# Patient Record
Sex: FEMALE | Race: WHITE | Employment: FULL TIME | URBAN - METROPOLITAN AREA
[De-identification: names, ages, dates, MRNs, and addresses within clinical notes are randomized per-mention and may not be internally consistent; named-entity substitution may affect disease eponyms.]

---

## 2019-09-11 ENCOUNTER — HOSPITAL ENCOUNTER (OUTPATIENT)
Dept: CT IMAGING | Age: 34
Discharge: HOME | End: 2019-09-11
Payer: COMMERCIAL

## 2019-09-11 DIAGNOSIS — R10.32 ABDOMINAL PAIN, LEFT LOWER QUADRANT: ICD-10-CM

## 2019-09-11 PROCEDURE — 74177 CT ABD & PELVIS W/CONTRAST: CPT

## 2019-09-11 RX ORDER — SODIUM CHLORIDE 0.9 % (FLUSH) 0.9 %
10 SYRINGE (ML) INJECTION
Status: COMPLETED | OUTPATIENT
Start: 2019-09-11 | End: 2019-09-11

## 2019-09-11 RX ADMIN — Medication 10 ML: at 17:00

## 2019-11-14 ENCOUNTER — OFFICE VISIT (OUTPATIENT)
Dept: RHEUMATOLOGY | Age: 34
End: 2019-11-14

## 2019-11-14 VITALS
WEIGHT: 134 LBS | RESPIRATION RATE: 18 BRPM | TEMPERATURE: 97.7 F | BODY MASS INDEX: 21.03 KG/M2 | HEIGHT: 67 IN | SYSTOLIC BLOOD PRESSURE: 128 MMHG | DIASTOLIC BLOOD PRESSURE: 71 MMHG | HEART RATE: 85 BPM | OXYGEN SATURATION: 99 %

## 2019-11-14 DIAGNOSIS — A04.72 C. DIFFICILE DIARRHEA: ICD-10-CM

## 2019-11-14 DIAGNOSIS — R10.2 PELVIC PAIN: Primary | ICD-10-CM

## 2019-11-14 DIAGNOSIS — M53.3 SACROILIAC JOINT PAIN: ICD-10-CM

## 2019-11-14 RX ORDER — OXCARBAZEPINE 150 MG/1
TABLET, FILM COATED ORAL
Refills: 0 | COMMUNITY
Start: 2019-10-29 | End: 2021-11-08

## 2019-11-14 RX ORDER — ACETAMINOPHEN AND CODEINE PHOSPHATE 300; 30 MG/1; MG/1
TABLET ORAL
COMMUNITY
Start: 2019-11-13 | End: 2021-11-08

## 2019-11-14 RX ORDER — IBUPROFEN 800 MG/1
TABLET ORAL
COMMUNITY
End: 2021-11-08

## 2019-11-14 RX ORDER — DICLOFENAC SODIUM 75 MG/1
TABLET, DELAYED RELEASE ORAL
COMMUNITY
Start: 2019-11-12 | End: 2021-11-08

## 2019-11-14 RX ORDER — DEXTROAMPHETAMINE SACCHARATE, AMPHETAMINE ASPARTATE, DEXTROAMPHETAMINE SULFATE AND AMPHETAMINE SULFATE 3.75; 3.75; 3.75; 3.75 MG/1; MG/1; MG/1; MG/1
15 TABLET ORAL
COMMUNITY

## 2019-11-14 RX ORDER — MESALAMINE 800 MG/1
TABLET, DELAYED RELEASE ORAL
Refills: 1 | COMMUNITY
Start: 2019-10-31 | End: 2021-11-08

## 2019-11-14 RX ORDER — BISMUTH SUBSALICYLATE 262 MG
1 TABLET,CHEWABLE ORAL DAILY
COMMUNITY
End: 2021-11-08

## 2019-11-14 NOTE — PATIENT INSTRUCTIONS
Please fill out your [address] you will receive after your visit in the mail or via [address]!

## 2019-11-14 NOTE — PROGRESS NOTES
REASON FOR VISIT    This is the initial evaluation for Ms. William a 37 y.o.  female for question of pelvic pain. The patient is referred to the [facility] at the request of Genevia Patient. HISTORY OF PRESENT ILLNESS     Previous medical records reviewed and summarized: yes    MHAQ: 0.75  Pain scale: 0/10    This is a 37 y.o. female. The patient notes that in 8/2019, she was very active and on 8/25, she had eaten a steak from a beach trip and she got fever, chills and left lateral hip pain along with diarrhea. She went to see her gyn because she thought it was ovarian cyst.  3 weeks later she was diagnosed with C. Diff and the flu. By day 5 of diarrhea, she had pain in lower mid back which was shooting down and now she has pain in entire pelvis on the left side. She was treated for c.diff with 10 days of vancomycin. Diarrhea has stopped now. She is now on azacol. She had worse pain when she weaned off of azacol and now she is back on it. She has pain in lower mid back, wrapping around the left side and into the groin. She has no pain when she is sleeping and it does not wake her up. When she is lying in bed, she feels it. When she gets up and walks and it is slightly better. Shower helps but by the time she gets ready to go to work, it is very severe. She has been working through all of this. She feels pain is slightly better with walking. Sitting makes it worse. Bending does not effect it. NO other pain. She has numbness in left foot (precedes all this). She has tried tylenol 3 and that helps. Prednisone was helpful. She was given 40 mg and she tapered it over 5 days. By day 3, the pain wasn't constant or creeping and was more manageable. The pain returned within 5-7 days of stopping the prednisone. NO other joint pain or swelling. NO eye issues or rashes.    + dry mouth right now.    She has had trans vaginal ultrasounds which showed right ovarian cyst. She had IUD removed. She had CT Abdomen which showed density on left side and and MRI pelvis and abdomen didn't show anything. She has had 3 x-rays of pelvic area with orthovirginia and it reportedly showed SI joint inflammation. REVIEW OF SYSTEMS    A 15 point review of systems was performed and summarized below. The questionnaire was reviewed with the patient and scanned into the patient's medical record.     General: denies recent weight gain, recent weight loss, fatigue, weakness, fever, drenching night sweats  Musculoskeletal: endorses  joint pain,  muscle paindenies joint swelling, morning stiffness   Ears: denies ringing in ears, hearing loss, deafness  Eyes: denies pain, light sensitive, redness, blindness, double vision, blurred vision, excess tearing, dryness, foreign body sensation  Mouth: denies sore tongue, oral ulcers, loss of taste, dryness, increased dental caries  Nose: denies nosebleeds, nasal ulcers  Throat: denies food stuck when swallowing, difficulty with swallowing, hoarseness, pain in jaw while chewing  Neck: denies swollen glands, tender glands  Cardiopulmonary: denies pain in chest with deep breaths, pain in chest when lying down, murmurs, sudden changes in heart beat, wheezing, dry cough, productive cough, shortness of breath at rest, shortness of breath on exertion, coughing of blood  Gastrointestinal: denies nausea, heartburn, stomach pain relieved by food, chronic constipation, chronic diarrhea, blood in stools, black stools  Genitourinary: denies vaginal dryness, pain or burning on urination, blood in urine, cloudy urine, vaginal ulcers, penile ulcers  Hematologic: denies anemia, bleeding tendency, blood clots, bleeding gums  Skin: denies easy bruising, hair loss, rash, rash worsened after sun exposure, hives/urticaria, skin thickening, skin tightness, nodules/bumps, color changes of hands or feet in the cold (Raynaud's)  Neurologic: endorses numbness or tingling in feet, muscle weakness  denies numbness or tingling in hands,   Psychiatric: denies depression, excessive worries, PTSD, Bipolar  Sleep: endorses poor sleep (8 hours),denies denies snoring, apnea, daytime somnolence, difficulty falling asleep, difficulty staying asleep     PAST MEDICAL HISTORY    History reviewed. No pertinent past medical history. Past Surgical History:   Procedure Laterality Date    HX COLONOSCOPY         FAMILY HISTORY    Family History   Problem Relation Age of Onset    Thyroid Disease Mother     Psoriasis Mother        SOCIAL HISTORY    Social History     Tobacco Use    Smoking status: Never Smoker    Smokeless tobacco: Never Used   Substance Use Topics    Alcohol use: Yes    Drug use: Never       MEDICATIONS      Current Outpatient Medications:     acetaminophen-codeine (TYLENOL #3) 300-30 mg per tablet, , Disp: , Rfl:     diclofenac EC (VOLTAREN) 75 mg EC tablet, , Disp: , Rfl:     OXcarbazepine (TRILEPTAL) 150 mg tablet, TK 1 T PO FOR 4 DAYS THEN INCREASE TO 2 TS PO BID PER SCHEDULE GIVEN, Disp: , Rfl: 0    mesalamine DR (ASACOL HD) 800 mg DR tablet, TK 1 T PO BID, Disp: , Rfl: 1    B.infantis-B.ani-B.long-B.bifi (PROBIOTIC 4X) 10-15 mg TbEC, Take  by mouth., Disp: , Rfl:     multivitamin (ONE A DAY) tablet, Take 1 Tab by mouth daily. , Disp: , Rfl:     ibuprofen (MOTRIN) 800 mg tablet, Take  by mouth., Disp: , Rfl:     dextroamphetamine-amphetamine (ADDERALL) 15 mg tablet, Take 15 mg by mouth., Disp: , Rfl:       ALLERGIES    Allergies   Allergen Reactions    Tramadol Other (comments)     confusion       PHYSICAL EXAMINATION    Visit Vitals  /71 (BP 1 Location: Right arm, BP Patient Position: Sitting)   Pulse 85   Temp 97.7 °F (36.5 °C) (Oral)   Resp 18   Ht 5' 7\" (1.702 m)   Wt 134 lb (60.8 kg)   SpO2 99%   BMI 20.99 kg/m²     Body mass index is 20.99 kg/m². General: NAD  HEENT: PERRL, anicteric, non-injected sclerae; oropharynx without ulcers, erythema, or exudate.   Moist mucous membranes. Lymphatic: No cervical or axillary lymphadenopathy. Cardiovascular: S1, S2,no R/M/G  Pulmonary: CTA b/l. No wheezes/rales/rhonchi. Abdominal: Soft,NTND, + BS. Skin: No rash, nodules, or periungual changes. Neuro: Alert; able to carry normal conversation    Musculoskeletal:   No swollen joints  KEIKO ngative b/l  No TTP of trochanteric bursa or pain with ROM movements of the hip joints b/l    DATA REVIEW    Prior medical records were reviewed and if applicable are summarized as below:    Labs:   N/A    Imaging:   CT abdomen: small b/l pleural effusions; small area of density in the left uterus. MRI abdomen/pelvis (9/2019): physiologic cyst in left ovary. Large right ovarian cyst.      ASSESSMENT AND PLAN    A 37 y.o. female presents for evaluation of lower back pain. The patient's symptoms are likely mechanical in nature and could be due to gluteus medius tendonopathy, vs other muscle strain. # Lower back pain: likely mechanical  - will obtain records and then evaluate the patient at next visit  - MRI did not reveal sacroilitis  - prednisone can often cause response even in mechanical issues and therefore cannot rely on this to make a diagnosis of inflammatory arthritis  - will obtain records    # HCM:  - flu vaccine 10/2019    RTC in 3 weeks    The patient voiced understanding of the aforementioned assessment and plan. Summary of plan was provided in the After Visit Summary patient instructions. I also provided education about [facility] and utility. Ms. Saadia Rollins has a reminder for a \"due or due soon\" health maintenance. I have asked that she contact her primary care provider for follow-up on this health maintenance.     TODAY'S ORDERS    Orders Placed This Encounter    HLA-B27    CBC WITH AUTOMATED DIFF    METABOLIC PANEL, COMPREHENSIVE    C REACTIVE PROTEIN, QT    SED RATE (ESR)    acetaminophen-codeine (TYLENOL #3) 300-30 mg per tablet    diclofenac EC (VOLTAREN) 75 mg EC tablet    OXcarbazepine (TRILEPTAL) 150 mg tablet    mesalamine DR (ASACOL HD) 800 mg DR tablet    B.infantis-B.ani-B.long-B.bifi (PROBIOTIC 4X) 10-15 mg TbEC    multivitamin (ONE A DAY) tablet    ibuprofen (MOTRIN) 800 mg tablet    dextroamphetamine-amphetamine (ADDERALL) 15 mg tablet       Future Appointments   Date Time Provider Bladimir Singleton   11/27/2019  2:00 PM Shon Pappas MD [address] [place], [place] [address]on [address], MD    Adult Rheumatology   [facility]  A Part of [facility], [address] [facility], [address]   Phone [phone]  Fax [phone]

## 2019-11-19 LAB
ALBUMIN SERPL-MCNC: 5 G/DL (ref 3.5–5.5)
ALBUMIN/GLOB SERPL: 2.5 {RATIO} (ref 1.2–2.2)
ALP SERPL-CCNC: 51 IU/L (ref 39–117)
ALT SERPL-CCNC: 17 IU/L (ref 0–32)
AST SERPL-CCNC: 13 IU/L (ref 0–40)
BASOPHILS # BLD AUTO: 0.1 X10E3/UL (ref 0–0.2)
BASOPHILS NFR BLD AUTO: 1 %
BILIRUB SERPL-MCNC: 0.4 MG/DL (ref 0–1.2)
BUN SERPL-MCNC: 13 MG/DL (ref 6–20)
BUN/CREAT SERPL: 18 (ref 9–23)
CALCIUM SERPL-MCNC: 9.7 MG/DL (ref 8.7–10.2)
CHLORIDE SERPL-SCNC: 101 MMOL/L (ref 96–106)
CO2 SERPL-SCNC: 20 MMOL/L (ref 20–29)
CREAT SERPL-MCNC: 0.73 MG/DL (ref 0.57–1)
CRP SERPL-MCNC: <1 MG/L (ref 0–10)
EOSINOPHIL # BLD AUTO: 0.1 X10E3/UL (ref 0–0.4)
EOSINOPHIL NFR BLD AUTO: 1 %
ERYTHROCYTE [DISTWIDTH] IN BLOOD BY AUTOMATED COUNT: 12.2 % (ref 12.3–15.4)
ERYTHROCYTE [SEDIMENTATION RATE] IN BLOOD BY WESTERGREN METHOD: 2 MM/HR (ref 0–32)
GLOBULIN SER CALC-MCNC: 2 G/DL (ref 1.5–4.5)
GLUCOSE SERPL-MCNC: 86 MG/DL (ref 65–99)
HCT VFR BLD AUTO: 40.3 % (ref 34–46.6)
HGB BLD-MCNC: 13.4 G/DL (ref 11.1–15.9)
HLA-B27 QL NAA+PROBE: NEGATIVE
IMM GRANULOCYTES # BLD AUTO: 0 X10E3/UL (ref 0–0.1)
IMM GRANULOCYTES NFR BLD AUTO: 0 %
LYMPHOCYTES # BLD AUTO: 2.7 X10E3/UL (ref 0.7–3.1)
LYMPHOCYTES NFR BLD AUTO: 37 %
MCH RBC QN AUTO: 29.5 PG (ref 26.6–33)
MCHC RBC AUTO-ENTMCNC: 33.3 G/DL (ref 31.5–35.7)
MCV RBC AUTO: 89 FL (ref 79–97)
MONOCYTES # BLD AUTO: 0.4 X10E3/UL (ref 0.1–0.9)
MONOCYTES NFR BLD AUTO: 5 %
NEUTROPHILS # BLD AUTO: 4.1 X10E3/UL (ref 1.4–7)
NEUTROPHILS NFR BLD AUTO: 56 %
PLATELET # BLD AUTO: 297 X10E3/UL (ref 150–450)
POTASSIUM SERPL-SCNC: 3.8 MMOL/L (ref 3.5–5.2)
PROT SERPL-MCNC: 7 G/DL (ref 6–8.5)
RBC # BLD AUTO: 4.54 X10E6/UL (ref 3.77–5.28)
SODIUM SERPL-SCNC: 139 MMOL/L (ref 134–144)
WBC # BLD AUTO: 7.4 X10E3/UL (ref 3.4–10.8)

## 2019-11-27 ENCOUNTER — OFFICE VISIT (OUTPATIENT)
Dept: RHEUMATOLOGY | Age: 34
End: 2019-11-27

## 2019-11-27 VITALS
RESPIRATION RATE: 16 BRPM | HEIGHT: 67 IN | SYSTOLIC BLOOD PRESSURE: 122 MMHG | HEART RATE: 99 BPM | TEMPERATURE: 97.8 F | DIASTOLIC BLOOD PRESSURE: 73 MMHG | OXYGEN SATURATION: 99 % | BODY MASS INDEX: 20.5 KG/M2 | WEIGHT: 130.6 LBS

## 2019-11-27 DIAGNOSIS — S76.012A SPRAIN, GLUTEUS MEDIUS, LEFT, INITIAL ENCOUNTER: ICD-10-CM

## 2019-11-27 DIAGNOSIS — M70.72 ISCHIAL BURSITIS OF LEFT SIDE: Primary | ICD-10-CM

## 2019-11-27 DIAGNOSIS — G57.02 PIRIFORMIS SYNDROME OF LEFT SIDE: ICD-10-CM

## 2019-11-27 DIAGNOSIS — R10.2 PELVIC PAIN: ICD-10-CM

## 2019-11-27 RX ORDER — MELOXICAM 15 MG/1
15 TABLET ORAL DAILY
COMMUNITY
End: 2021-11-08

## 2019-11-27 RX ORDER — DICLOFENAC SODIUM 10 MG/G
2 GEL TOPICAL 4 TIMES DAILY
Qty: 1 EACH | Refills: 5 | Status: SHIPPED | OUTPATIENT
Start: 2019-11-27 | End: 2021-11-08

## 2019-11-27 NOTE — PROGRESS NOTES
Chief Complaint   Patient presents with    Joint Pain     lower left sacrum area     1. Have you been to the ER, urgent care clinic since your last visit? Hospitalized since your last visit? No    2. Have you seen or consulted any other health care providers outside of the [address] [place] [address] since your last visit? Include any pap smears or colon screening.  Yes Where: or[facility]opedic

## 2019-11-27 NOTE — PROGRESS NOTES
REASON FOR VISIT    This is the initial evaluation for Ms. William a 37 y.o.  female for question of pelvic pain. The patient is referred to the [facility] at the request of Aron Lerma. HISTORY OF PRESENT ILLNESS     Previous medical records reviewed and summarized: yes    MHAQ: 0.625  Pain scale: 0/10  Patient notes she still has the pain but it is slightly improved with PT. She saw orthopedic who also felt this was a mechanical issue. NSAIDs have not helped. REVIEW OF SYSTEMS    A 15 point review of systems was performed and summarized below. The questionnaire was reviewed with the patient and scanned into the patient's medical record.     General: denies recent weight gain, recent weight loss, fatigue, weakness, fever, drenching night sweats  Musculoskeletal: endorses  joint pain,  muscle paindenies joint swelling, morning stiffness   Ears: denies ringing in ears, hearing loss, deafness  Eyes: denies pain, light sensitive, redness, blindness, double vision, blurred vision, excess tearing, dryness, foreign body sensation  Mouth: denies sore tongue, oral ulcers, loss of taste, dryness, increased dental caries  Nose: denies nosebleeds, nasal ulcers  Throat: denies food stuck when swallowing, difficulty with swallowing, hoarseness, pain in jaw while chewing  Neck: denies swollen glands, tender glands  Cardiopulmonary: denies pain in chest with deep breaths, pain in chest when lying down, murmurs, sudden changes in heart beat, wheezing, dry cough, productive cough, shortness of breath at rest, shortness of breath on exertion, coughing of blood  Gastrointestinal: denies nausea, heartburn, stomach pain relieved by food, chronic constipation, chronic diarrhea, blood in stools, black stools  Genitourinary: denies vaginal dryness, pain or burning on urination, blood in urine, cloudy urine, vaginal ulcers, penile ulcers  Hematologic: denies anemia, bleeding tendency, blood clots, bleeding gums  Skin: denies easy bruising, hair loss, rash, rash worsened after sun exposure, hives/urticaria, skin thickening, skin tightness, nodules/bumps, color changes of hands or feet in the cold (Raynaud's)  Neurologic: endorses numbness or tingling in feet, muscle weakness  denies numbness or tingling in hands,   Psychiatric: denies depression, excessive worries, PTSD, Bipolar  Sleep: endorses poor sleep (8 hours),denies denies snoring, apnea, daytime somnolence, difficulty falling asleep, difficulty staying asleep     PAST MEDICAL HISTORY    History reviewed. No pertinent past medical history. Past Surgical History:   Procedure Laterality Date    HX COLONOSCOPY         FAMILY HISTORY    Family History   Problem Relation Age of Onset    Thyroid Disease Mother     Psoriasis Mother        SOCIAL HISTORY    Social History     Tobacco Use    Smoking status: Never Smoker    Smokeless tobacco: Never Used   Substance Use Topics    Alcohol use: Yes    Drug use: Never       MEDICATIONS      Current Outpatient Medications:     meloxicam (MOBIC) 15 mg tablet, Take 15 mg by mouth daily. , Disp: , Rfl:     diclofenac (VOLTAREN) 1 % gel, Apply 2 g to affected area four (4) times daily. , Disp: 1 Each, Rfl: 5    acetaminophen-codeine (TYLENOL #3) 300-30 mg per tablet, , Disp: , Rfl:     mesalamine DR (ASACOL HD) 800 mg DR tablet, TK 1 T PO BID, Disp: , Rfl: 1    B.infantis-B.ani-B.long-B.bifi (PROBIOTIC 4X) 10-15 mg TbEC, Take  by mouth., Disp: , Rfl:     multivitamin (ONE A DAY) tablet, Take 1 Tab by mouth daily. , Disp: , Rfl:     dextroamphetamine-amphetamine (ADDERALL) 15 mg tablet, Take 15 mg by mouth., Disp: , Rfl:     diclofenac EC (VOLTAREN) 75 mg EC tablet, , Disp: , Rfl:     OXcarbazepine (TRILEPTAL) 150 mg tablet, TK 1 T PO FOR 4 DAYS THEN INCREASE TO 2 TS PO BID PER SCHEDULE GIVEN, Disp: , Rfl: 0    ibuprofen (MOTRIN) 800 mg tablet, Take  by mouth., Disp: , Rfl: ALLERGIES    Allergies   Allergen Reactions    Tramadol Other (comments)     confusion       PHYSICAL EXAMINATION    Visit Vitals  /73 (BP 1 Location: Right arm, BP Patient Position: Sitting)   Pulse 99   Temp 97.8 °F (36.6 °C)   Resp 16   Ht 5' 7\" (1.702 m)   Wt 130 lb 9.6 oz (59.2 kg)   SpO2 99%   BMI 20.45 kg/m²     Body mass index is 20.45 kg/m². General: NAD  HEENT: PERRL, anicteric, non-injected sclerae; oropharynx without ulcers, erythema, or exudate. Moist mucous membranes. Lymphatic: No cervical or axillary lymphadenopathy. Cardiovascular: S1, S2,no R/M/G  Pulmonary: CTA b/l. No wheezes/rales/rhonchi. Abdominal: Soft,NTND, + BS. Skin: No rash, nodules, or periungual changes. Neuro: Alert; able to carry normal conversation    Musculoskeletal:   No swollen joints  Gluteus medius tendinopathy maneuver negative  TTP at the left ischial bursa    DATA REVIEW    Prior medical records were reviewed and if applicable are summarized as below:    Labs:   N/A    Imaging:   CT abdomen: small b/l pleural effusions; small area of density in the left uterus. MRI abdomen/pelvis (9/2019): physiologic cyst in left ovary. Large right ovarian cyst.      ASSESSMENT AND PLAN    A 37 y.o. female presents for evaluation of lower back pain. The patient's symptoms are likely mechanical in nature and could be due to gluteus medius tendonopathy, vs piriformis syndrome vs ischial bursitis or combination. # Lower back pain: likely mechanical  - continue PT  - new referral done today  - advised to continue with NSAIDs with food  - voltaren gel prescribed today  - will give her a work letter if she needs to stay out of work longer    # HCM:  - flu vaccine 10/2019    RTC in PRN    The patient voiced understanding of the aforementioned assessment and plan. Summary of plan was provided in the After Visit Summary patient instructions. I also provided education about MyChart setup and utility.     Ms. Leslie Mcneal has a reminder for a \"due or due soon\" health maintenance. I have asked that she contact her primary care provider for follow-up on this health maintenance. TODAY'S ORDERS    Orders Placed This Encounter    REFERRAL TO PHYSICAL THERAPY    meloxicam (MOBIC) 15 mg tablet    diclofenac (VOLTAREN) 1 % gel       No future appointments.     Jeb Fuentes MD    Adult Rheumatology   [facility]  A Part of [facility], [address] [place] [address]   Phone [phone]  Fax [phone]

## 2019-11-27 NOTE — PATIENT INSTRUCTIONS
Please fill out your [address] you will receive after your visit in the mail or via [address]!

## 2021-11-08 ENCOUNTER — OFFICE VISIT (OUTPATIENT)
Dept: NEUROLOGY | Age: 36
End: 2021-11-08
Payer: COMMERCIAL

## 2021-11-08 VITALS
SYSTOLIC BLOOD PRESSURE: 128 MMHG | OXYGEN SATURATION: 98 % | DIASTOLIC BLOOD PRESSURE: 84 MMHG | HEART RATE: 100 BPM | BODY MASS INDEX: 21.03 KG/M2 | HEIGHT: 67 IN | WEIGHT: 134 LBS

## 2021-11-08 DIAGNOSIS — M54.16 CHRONIC LEFT-SIDED LUMBAR RADICULOPATHY: ICD-10-CM

## 2021-11-08 DIAGNOSIS — R29.2 GENERALIZED HYPERREFLEXIA: Primary | ICD-10-CM

## 2021-11-08 DIAGNOSIS — R25.8 CLONUS: ICD-10-CM

## 2021-11-08 PROCEDURE — 99243 OFF/OP CNSLTJ NEW/EST LOW 30: CPT | Performed by: PSYCHIATRY & NEUROLOGY

## 2021-11-08 RX ORDER — ZINC GLUCONATE 10 MG
1 LOZENGE ORAL
COMMUNITY

## 2021-11-08 RX ORDER — DULOXETIN HYDROCHLORIDE 20 MG/1
20 CAPSULE, DELAYED RELEASE ORAL DAILY
COMMUNITY

## 2021-11-08 NOTE — PROGRESS NOTES
Chief Complaint   Patient presents with    New Patient     \" tingling down left leg, and intermittent numbnes in both feet, and a burning sensation in the gluteal area. \"       Referred by: Dr. 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 She has been in physical therapy long-term now with dry needling and trigger point treatments. She was having continued paresthesias more on the left and occasionally the right foot. She saw PMNR at [facility] and had an MRI of the lumbosacral spine done recently showing a small disc protrusion at L4-5. No significant spondylosis or stenosis otherwise. She did see a specialist in pelvic floor management and there is consideration that she is having a lot of muscle spasm of the pelvic floor muscles causing a pseudosciatica down the left leg. She is being considered for Botox injections. She is currently completing a course of Valium which has been very helpful for her symptoms. She is here to discuss the symptoms. Review of Systems   Musculoskeletal: Positive for back pain and myalgias. Neurological: Positive for tingling and sensory change. Negative for weakness. All other systems reviewed and are negative. No past medical history on file. Family History   Problem Relation Age of Onset    Thyroid Disease Mother     Psoriasis Mother      Social History     Socioeconomic History    Marital status: SINGLE     Spouse name: Not on file    Number of children: Not on file    Years of education: Not on file    Highest education level: Not on file   Occupational History    Not on file   Tobacco Use    Smoking status: Never Smoker    Smokeless tobacco: Never Used   Substance and Sexual Activity    Alcohol use:  Yes    Drug use: Never    Sexual activity: Not on file   Other Topics Concern    Not on file   Social History Narrative    Not on file     Social Determinants of Health     Financial Resource Strain:     Difficulty of Paying Living Expenses: Not on file   Food Insecurity:     Worried About Running Out of Food in the Last Year: Not on file    Eligio of Food in the Last Year: Not on file   Transportation Needs:     Lack of Transportation (Medical): Not on file    Lack of Transportation (Non-Medical): Not on file   Physical Activity:     Days of Exercise per Week: Not on file    Minutes of Exercise per Session: Not on file   Stress:     Feeling of Stress : Not on file   Social Connections:     Frequency of Communication with Friends and Family: Not on file    Frequency of Social Gatherings with Friends and Family: Not on file    Attends Yazidism Services: Not on file    Active Member of [employer] or Organizations: Not on file    Attends Club or Organization Meetings: Not on file    Marital Status: Not on file   Intimate Partner Violence:     Fear of Current or Ex-Partner: Not on file    Emotionally Abused: Not on file    Physically Abused: Not on file    Sexually Abused: Not on file   Housing Stability:     Unable to Pay for Housing in the Last Year: Not on file    Number of [place] in the Last Year: Not on file    Unstable Housing in the Last Year: Not on file     Current Outpatient Medications   Medication Sig    DULoxetine (Cymbalta) 20 mg capsule Take 20 mg by mouth daily.  lisdexamfetamine (Vyvanse) 20 mg capsule Take 20 mg by mouth daily.  magnesium 250 mg tab Take 1 Tablet by mouth.  dextroamphetamine-amphetamine (ADDERALL) 15 mg tablet Take 15 mg by mouth. No current facility-administered medications for this visit. Allergies   Allergen Reactions    Tramadol Other (comments)     confusion         Neurologic Exam     Mental Status   Oriented to person, place, and time. Cranial Nerves   Cranial nerves II through XII intact.      Motor Exam   Muscle bulk: normal    Strength Strength 5/5 throughout. Sensory Exam   Light touch normal.     Gait, Coordination, and Reflexes     Gait  Gait: normalBilateral upper extremities 3/4 bilateral patella 3+     Physical Exam  Vitals and nursing note reviewed. Constitutional:       Appearance: Normal appearance. Neurological:      Mental Status: She is alert and oriented to person, place, and time. Gait: Gait is intact. Deep Tendon Reflexes: Strength normal.       Visit Vitals  /84 (BP 1 Location: Right upper arm, BP Patient Position: Sitting)   Pulse 100   Ht 5' 7\" (1.702 m)   Wt 134 lb (60.8 kg)   SpO2 98%   BMI 20.99 kg/m²       Lab Results   Component Value Date/Time    WBC 7.4 11/14/2019 12:04 PM    HGB 13.4 11/14/2019 12:04 PM    HCT 40.3 11/14/2019 12:04 PM    PLATELET 914 38/39/8368 12:04 PM    MCV 89 11/14/2019 12:04 PM     Lab Results   Component Value Date/Time    Glucose 86 11/14/2019 12:04 PM    Creatinine 0.73 11/14/2019 12:04 PM      No results found for: CHOL, CHOLPOCT, HDL, LDL, LDLC, LDLCPOC, LDLCEXT, TRIGL, TGLPOCT, CHHD, CHHDX  Lab Results   Component Value Date/Time    ALT (SGPT) 17 11/14/2019 12:04 PM    Alk. phosphatase 51 11/14/2019 12:04 PM    Bilirubin, total 0.4 11/14/2019 12:04 PM    Albumin 5.0 11/14/2019 12:04 PM    Protein, total 7.0 11/14/2019 12:04 PM    PLATELET 546 81/73/6041 12:04 PM        CT Results (maximum last 3): Results from Hospital Encounter encounter on 09/11/19    CT ABD PELV W CONT    Narrative  EXAM: CT ABD PELV W CONT    INDICATION: Abdominal pain, left lower quadrant left buttock pain extending  toward the left groin    COMPARISON: None    CONTRAST: 100 mL of Isovue-370. TECHNIQUE:  Following the uneventful intravenous administration of contrast, thin axial  images were obtained through the abdomen and pelvis. Coronal and sagittal  reconstructions were generated. Oral contrast was  administered.  CT dose  reduction was achieved through use of a standardized protocol tailored for this  examination and automatic exposure control for dose modulation. FINDINGS:  LUNG BASES: Clear. INCIDENTALLY IMAGED HEART AND MEDIASTINUM: There are very small bilateral  pleural effusions noted. These are quite minimal but they are present. Etiology  is uncertain. Clenton Reas LIVER: No mass or biliary dilatation. GALLBLADDER: Unremarkable. SPLEEN: No mass. PANCREAS: No mass or ductal dilatation. ADRENALS: Unremarkable. KIDNEYS: No mass, calculus, or hydronephrosis. STOMACH: Unremarkable. SMALL BOWEL: No dilatation or wall thickening. COLON: No dilatation or wall thickening. There is no evidence for diverticulitis  or wall thickening. APPENDIX: The appendix is normal.. PERITONEUM: No ascites or pneumoperitoneum. RETROPERITONEUM: No lymphadenopathy or aortic aneurysm. REPRODUCTIVE ORGANS: The uterus is retroverted. Adjacent to the left side of the uterus and possibly arising from the left-sided  uterus is an amorphous area of high density enhancement measuring approximately  4.2 x 3.9 cm. This may represent a subserosal or pedunculated fibroid. Exact  etiology of this is uncertain. Conceivably this could represent vascular  congestion in this area. Further evaluation is suggested. Pelvic MRI or pelvic  ultrasound is suggested. It does appear to be immediately adjacent to a  prominent left internal iliac vein. There is a 3 cm simple cyst in the right ovary. Left ovary images normally. There is a small amount of free fluid that is most likely physiologic. URINARY BLADDER: No mass or calculus. BONES: No destructive bone lesion. ADDITIONAL COMMENTS: N/A    Impression  IMPRESSION:    1. Small bilateral pleural effusions. Etiology is uncertain. Clinical  correlation is suggested.   2. Amorphous area of increased density suggesting enhancement adjacent to and  probably arising from the left side of the uterus this is likely a subserosal or  pedunculated fibroid extending to the left pelvic sidewall. This is not  definitive diagnosis. Pelvic MRI or ultrasound is suggested. 3. 3 cm right ovarian cyst with small amount of free fluid is probably  physiologic. 4. No evidence for appendicitis or diverticulitis. MRI Results (maximum last 3): Results from Abstract encounter on 12/10/19    MRI ABD PELV W WO CONT      PET Results (maximum last 3): No results found for this or any previous visit. Assessment and Plan   Diagnoses and all orders for this visit:    1. Generalized hyperreflexia  -     MRI CERV SPINE WO CONT; Future    2. Clonus  -     MRI CERV SPINE WO CONT; Future    3. Chronic left-sided lumbar radiculopathy      76-year-old woman who has been having some sciatica-like symptoms predominantly down the left leg lesser so on the right foot. It does sound very possible that peripheral nerves from the lumbosacral plexus are being compressed by the pelvic floor muscles. I think she would be a good candidate for consideration of Botox which sounds like is being considered in progress now. On my exam with her today, I did find diffuse hyperreflexia approaching clonus. Because of those findings I am going to complete an MRI of the cervical spine to make sure were not dealing with any type of significant issue like a cord compression or syrinx. I discussed this plan with her and she agrees. Otherwise, continue with the current plans and progress addressing the pelvic floor musculature. 45 minutes of time was spent in total today reviewing the medical record to include the notes from [facility], neuroimaging, face-to-face time with examination, and time completing documentation today. I reviewed and decided to continue the current medications. This clinical note was dictated with an electronic dictation software that can make unintentional errors. If there are any questions, please contact me directly for clarification.       A notice of this visit/encounter being completed has been sent electronically to the patient's PCP and/or referring provider.      [facility] [address]  Diplomate ABPN

## 2021-11-08 NOTE — PROGRESS NOTES
Chief Complaint   Patient presents with    New Patient     \" tingling down left leg, and intermittent numbnes in both feet, and a burning sensation in the gluteal area. \"     Visit Vitals  /84 (BP 1 Location: Right upper arm, BP Patient Position: Sitting)   Pulse 100   Ht 5' 7\" (1.702 m)   Wt 134 lb (60.8 kg)   SpO2 98%   BMI 20.99 kg/m²

## 2021-11-23 ENCOUNTER — HOSPITAL ENCOUNTER (OUTPATIENT)
Dept: MRI IMAGING | Age: 36
Discharge: HOME | End: 2021-11-23
Payer: COMMERCIAL

## 2021-11-23 DIAGNOSIS — R29.2 GENERALIZED HYPERREFLEXIA: ICD-10-CM

## 2021-11-23 DIAGNOSIS — R25.8 CLONUS: ICD-10-CM

## 2021-11-23 PROCEDURE — 72141 MRI NECK SPINE W/O DYE: CPT | Performed by: PSYCHIATRY & NEUROLOGY

## 2021-11-29 NOTE — PROGRESS NOTES
Imaging of the upper spine is very reassuring. There is no evidence of mass or multiple sclerosis which is what I wanted to rule out. Continue with her other plans and progress.

## 2021-12-09 NOTE — PROGRESS NOTES
Called and LVM for the patient with imaging results, requested a call back with any additional questions

## 2023-09-05 ENCOUNTER — TELEPHONE (OUTPATIENT)
Age: 38
End: 2023-09-05

## 2023-09-05 NOTE — TELEPHONE ENCOUNTER
OK to establish? Reason for Call: New Patient/New to Provider Appointment needed: New   Patient Request Appointment     QUESTIONS     Reason for appointment request? Requested Provider unavailable - Norma Gale       Additional Information for Provider?  Patient states she spoke with desired   provider above who informed her to contact his nurse/office and he would   take her on to establish care with.   ---------------------------------------------------------------------------

## 2023-12-06 SDOH — ECONOMIC STABILITY: FOOD INSECURITY: WITHIN THE PAST 12 MONTHS, YOU WORRIED THAT YOUR FOOD WOULD RUN OUT BEFORE YOU GOT MONEY TO BUY MORE.: NEVER TRUE

## 2023-12-06 SDOH — ECONOMIC STABILITY: HOUSING INSECURITY
IN THE LAST 12 MONTHS, WAS THERE A TIME WHEN YOU DID NOT HAVE A STEADY PLACE TO SLEEP OR SLEPT IN A SHELTER (INCLUDING NOW)?: NO

## 2023-12-06 SDOH — ECONOMIC STABILITY: FOOD INSECURITY: WITHIN THE PAST 12 MONTHS, THE FOOD YOU BOUGHT JUST DIDN'T LAST AND YOU DIDN'T HAVE MONEY TO GET MORE.: NEVER TRUE

## 2023-12-06 SDOH — ECONOMIC STABILITY: TRANSPORTATION INSECURITY
IN THE PAST 12 MONTHS, HAS LACK OF TRANSPORTATION KEPT YOU FROM MEETINGS, WORK, OR FROM GETTING THINGS NEEDED FOR DAILY LIVING?: NO

## 2023-12-06 SDOH — ECONOMIC STABILITY: INCOME INSECURITY: HOW HARD IS IT FOR YOU TO PAY FOR THE VERY BASICS LIKE FOOD, HOUSING, MEDICAL CARE, AND HEATING?: NOT HARD AT ALL

## 2023-12-08 ENCOUNTER — OFFICE VISIT (OUTPATIENT)
Age: 38
End: 2023-12-08
Payer: COMMERCIAL

## 2023-12-08 VITALS
DIASTOLIC BLOOD PRESSURE: 85 MMHG | HEIGHT: 68 IN | SYSTOLIC BLOOD PRESSURE: 132 MMHG | OXYGEN SATURATION: 100 % | HEART RATE: 85 BPM | RESPIRATION RATE: 16 BRPM | BODY MASS INDEX: 21.34 KG/M2 | WEIGHT: 140.8 LBS | TEMPERATURE: 98.1 F

## 2023-12-08 DIAGNOSIS — G57.03 BILATERAL PIRIFORMIS SYNDROME: ICD-10-CM

## 2023-12-08 DIAGNOSIS — Z00.00 ROUTINE PHYSICAL EXAMINATION: ICD-10-CM

## 2023-12-08 DIAGNOSIS — F43.9 STRESS: ICD-10-CM

## 2023-12-08 DIAGNOSIS — Z00.00 ROUTINE PHYSICAL EXAMINATION: Primary | ICD-10-CM

## 2023-12-08 DIAGNOSIS — F98.8 ATTENTION DEFICIT DISORDER (ADD) WITHOUT HYPERACTIVITY: ICD-10-CM

## 2023-12-08 PROBLEM — I73.00 RAYNAUD'S PHENOMENON WITHOUT GANGRENE: Status: ACTIVE | Noted: 2023-12-08

## 2023-12-08 PROBLEM — G58.8 NEURALGIA OF LEFT PUDENDAL NERVE: Status: ACTIVE | Noted: 2023-12-08

## 2023-12-08 PROCEDURE — 99386 PREV VISIT NEW AGE 40-64: CPT | Performed by: INTERNAL MEDICINE

## 2023-12-08 RX ORDER — BACLOFEN 10 MG/1
TABLET ORAL PRN
COMMUNITY
Start: 2022-10-05

## 2023-12-08 RX ORDER — ATOMOXETINE 25 MG/1
25 CAPSULE ORAL DAILY
Qty: 30 CAPSULE | Refills: 0 | Status: SHIPPED | OUTPATIENT
Start: 2024-01-07 | End: 2024-02-06

## 2023-12-08 RX ORDER — DIAZEPAM 5 MG/1
5 TABLET ORAL PRN
COMMUNITY

## 2023-12-08 RX ORDER — ATOMOXETINE 25 MG/1
25 CAPSULE ORAL DAILY
Qty: 30 CAPSULE | Refills: 0 | Status: SHIPPED | OUTPATIENT
Start: 2023-12-08 | End: 2024-01-07

## 2023-12-08 SDOH — ECONOMIC STABILITY: INCOME INSECURITY: HOW HARD IS IT FOR YOU TO PAY FOR THE VERY BASICS LIKE FOOD, HOUSING, MEDICAL CARE, AND HEATING?: NOT HARD AT ALL

## 2023-12-08 SDOH — ECONOMIC STABILITY: FOOD INSECURITY: WITHIN THE PAST 12 MONTHS, THE FOOD YOU BOUGHT JUST DIDN'T LAST AND YOU DIDN'T HAVE MONEY TO GET MORE.: NEVER TRUE

## 2023-12-08 SDOH — ECONOMIC STABILITY: FOOD INSECURITY: WITHIN THE PAST 12 MONTHS, YOU WORRIED THAT YOUR FOOD WOULD RUN OUT BEFORE YOU GOT MONEY TO BUY MORE.: NEVER TRUE

## 2023-12-08 ASSESSMENT — LIFESTYLE VARIABLES
HOW MANY STANDARD DRINKS CONTAINING ALCOHOL DO YOU HAVE ON A TYPICAL DAY: 1 OR 2
HOW OFTEN DO YOU HAVE A DRINK CONTAINING ALCOHOL: 4 OR MORE TIMES A WEEK

## 2023-12-08 ASSESSMENT — PATIENT HEALTH QUESTIONNAIRE - PHQ9
SUM OF ALL RESPONSES TO PHQ QUESTIONS 1-9: 1
SUM OF ALL RESPONSES TO PHQ QUESTIONS 1-9: 1
2. FEELING DOWN, DEPRESSED OR HOPELESS: 0
1. LITTLE INTEREST OR PLEASURE IN DOING THINGS: 1
SUM OF ALL RESPONSES TO PHQ QUESTIONS 1-9: 1
SUM OF ALL RESPONSES TO PHQ9 QUESTIONS 1 & 2: 1
SUM OF ALL RESPONSES TO PHQ QUESTIONS 1-9: 1

## 2023-12-08 ASSESSMENT — ENCOUNTER SYMPTOMS
VOMITING: 0
SHORTNESS OF BREATH: 0
NAUSEA: 0
BLOOD IN STOOL: 0
COUGH: 0
CONSTIPATION: 0
DIARRHEA: 0
ABDOMINAL PAIN: 1

## 2023-12-08 NOTE — ASSESSMENT & PLAN NOTE
New dx to me, chronic issue, she has had extensive working in the past at [facility]. Available records via Care Everywhere will be reviewed.  Monitored by specialist- no acute findings meriting change in the plan

## 2023-12-08 NOTE — ASSESSMENT & PLAN NOTE
New dx to me, chronic issue. She brought in neuropsych testing and this was reviewed & will be added to her chart. She wants to avoid stimulants due to impact on Raynaulds and didn't tolerate Wellbutrin. Will try Strattera. Reviewed expectations. [facility].

## 2023-12-08 NOTE — PROGRESS NOTES
sure if cervix is present. OBGYN records requested  Breast: reviewed guidelines, UTD, done by OBGYN, records requested. The following acute and/or chronic problems were addressed today:    Mental Health Review  Patient is seen for ADHD. Previously getting medications from OBGYN. Current treatment regimen includes: nothing. She reports stimulants impact raynauds. Wellbutrin caused jaw clenching. She has been on Adderall IR/XR and Vyvanse. [facility]. Last [facility] on 5/5/23 - 30mg - #90. [facility]  also revealed valium 10mg. 30 tabs provided on 7/9, 3/24, and 1/15 of this year. The following sections were reviewed & updated as appropriate: Problem List, Allergies, PMH, PSH, FH, and SH. Prior to Admission medications    Medication Sig Start Date End Date Taking? Authorizing Provider   diazePAM (VALIUM) 5 MG tablet Take 1 tablet by mouth as needed for Anxiety. As needed vaginally   Yes Provider, MD Napoleon   baclofen (LIORESAL) 10 MG tablet as needed Vaginally 10/5/22  Yes Provider, MD Napoleon        Review of Systems   Constitutional:  Negative for chills, fatigue and fever. Respiratory:  Negative for cough and shortness of breath. Cardiovascular:  Negative for chest pain and palpitations. Gastrointestinal:  Positive for abdominal pain (and pelvic, deep cramping). Negative for blood in stool, constipation, diarrhea, nausea and vomiting. Musculoskeletal:  Positive for arthralgias (R elbow, fingers, bilateral knees, bilateral feet) and myalgias (R flank and L hamstring). Neurological:  Positive for headaches (bilateral neck, coming up over her head, tightness). Negative for dizziness and numbness. Hematological:  Does not bruise/bleed easily. Psychiatric/Behavioral:  Negative for dysphoric mood and sleep disturbance. The patient is not nervous/anxious. Objective:   Physical Exam  Constitutional:       General: She is not in acute distress.   HENT:      Right Ear:

## 2023-12-08 NOTE — ASSESSMENT & PLAN NOTE
New dx, acute on chronic issue. She reports h/o post-partum depression and possibly anxiety in the past.  Has been on medications in the past (SSRI's and SNRI). She is having some more issues currently that sound like it could be all stress related and likely worse due to being off ADHD meds. Will treat this first and then re-evaluate need mood medications.

## 2023-12-12 LAB
ALBUMIN SERPL-MCNC: 4.7 G/DL (ref 3.9–4.9)
ALBUMIN/GLOB SERPL: 2.4 {RATIO} (ref 1.2–2.2)
ALP SERPL-CCNC: 52 IU/L (ref 44–121)
ALT SERPL-CCNC: 20 IU/L (ref 0–32)
AST SERPL-CCNC: 19 IU/L (ref 0–40)
BILIRUB SERPL-MCNC: 0.8 MG/DL (ref 0–1.2)
BUN SERPL-MCNC: 10 MG/DL (ref 6–24)
BUN/CREAT SERPL: 12 (ref 9–23)
CALCIUM SERPL-MCNC: 9.3 MG/DL (ref 8.7–10.2)
CHLORIDE SERPL-SCNC: 107 MMOL/L (ref 96–106)
CHOLEST SERPL-MCNC: 189 MG/DL (ref 100–199)
CO2 SERPL-SCNC: 21 MMOL/L (ref 20–29)
CREAT SERPL-MCNC: 0.86 MG/DL (ref 0.57–1)
EGFRCR SERPLBLD CKD-EPI 2021: 88 ML/MIN/1.73
ERYTHROCYTE [DISTWIDTH] IN BLOOD BY AUTOMATED COUNT: 12.1 % (ref 11.7–15.4)
GLOBULIN SER CALC-MCNC: 2 G/DL (ref 1.5–4.5)
GLUCOSE SERPL-MCNC: 91 MG/DL (ref 70–99)
HCT VFR BLD AUTO: 43.3 % (ref 34–46.6)
HDLC SERPL-MCNC: 88 MG/DL
HGB BLD-MCNC: 14.6 G/DL (ref 11.1–15.9)
LDLC SERPL CALC-MCNC: 89 MG/DL (ref 0–99)
MCH RBC QN AUTO: 30.2 PG (ref 26.6–33)
MCHC RBC AUTO-ENTMCNC: 33.7 G/DL (ref 31.5–35.7)
MCV RBC AUTO: 90 FL (ref 79–97)
PLATELET # BLD AUTO: 321 X10E3/UL (ref 150–450)
POTASSIUM SERPL-SCNC: 4.5 MMOL/L (ref 3.5–5.2)
PROT SERPL-MCNC: 6.7 G/DL (ref 6–8.5)
RBC # BLD AUTO: 4.83 X10E6/UL (ref 3.77–5.28)
SODIUM SERPL-SCNC: 139 MMOL/L (ref 134–144)
TRIGL SERPL-MCNC: 63 MG/DL (ref 0–149)
VLDLC SERPL CALC-MCNC: 12 MG/DL (ref 5–40)
WBC # BLD AUTO: 5.2 X10E3/UL (ref 3.4–10.8)

## 2023-12-12 NOTE — RESULT ENCOUNTER NOTE
Letter sent to patient.  All labs are stable or at goal for her.  The 10-year ASCVD risk score (Soto MOSELEY, et al., 2019) is: 0.2%

## 2024-01-19 DIAGNOSIS — G57.03 BILATERAL PIRIFORMIS SYNDROME: Primary | ICD-10-CM

## 2024-01-19 DIAGNOSIS — F98.8 ATTENTION DEFICIT DISORDER (ADD) WITHOUT HYPERACTIVITY: ICD-10-CM

## 2024-01-19 DIAGNOSIS — G58.8 NEURALGIA OF LEFT PUDENDAL NERVE: ICD-10-CM

## 2024-01-19 RX ORDER — DIAZEPAM 10 MG/1
10 TABLET ORAL PRN
Qty: 30 TABLET | Refills: 0 | Status: SHIPPED | OUTPATIENT
Start: 2024-01-19 | End: 2024-07-17

## 2024-01-19 RX ORDER — ATOMOXETINE 25 MG/1
25 CAPSULE ORAL DAILY
Qty: 90 CAPSULE | Refills: 1 | Status: SHIPPED | OUTPATIENT
Start: 2024-01-19 | End: 2024-04-18

## 2024-01-19 RX ORDER — TIZANIDINE 4 MG/1
4 TABLET ORAL 3 TIMES DAILY PRN
Qty: 30 TABLET | Refills: 2 | Status: SHIPPED | OUTPATIENT
Start: 2024-01-19

## 2024-01-19 NOTE — TELEPHONE ENCOUNTER
Chart reviewed.  See [email or web address] message from today.  Last filled in December.  Last visit: 12/8/2023    PDMP reviewed on 1/19/2024  4:21 PM.    Future Appointments   Date Time Provider Department Center   6/17/2024  3:00 PM Blair Vegas MD [facility]   12/13/2024  8:40 AM Blair Vegas MD [facility]

## 2024-01-20 NOTE — TELEPHONE ENCOUNTER
Chart reviewed.  Last filled on: Valium 10mg, 7/9/23 - #30.  Previously filled on 3/24 and 1/15.  Last visit: 12/8/2023    PDMP reviewed on 1/19/2024  9:05 PM.    Future Appointments   Date Time Provider Department Center   6/17/2024  3:00 PM Blair Vegas MD [facility]   12/13/2024  8:40 AM Blair Vegas MD [facility]

## 2024-01-22 ENCOUNTER — TELEPHONE (OUTPATIENT)
Age: 39
End: 2024-01-22

## 2024-01-22 DIAGNOSIS — G58.8 NEURALGIA OF LEFT PUDENDAL NERVE: ICD-10-CM

## 2024-01-22 DIAGNOSIS — G57.03 BILATERAL PIRIFORMIS SYNDROME: ICD-10-CM

## 2024-01-22 RX ORDER — DIAZEPAM 10 MG/1
TABLET ORAL
Qty: 30 TABLET | Refills: 0 | Status: SHIPPED | OUTPATIENT
Start: 2024-01-22 | End: 2024-01-24 | Stop reason: SDUPTHER

## 2024-01-22 NOTE — TELEPHONE ENCOUNTER
Reason for call:  needs clarification on directions for diazepam 10mg.  Has two sets of directions    Is this a new problem: Yes    Date of last appointment:  12/8/2023     Can we respond via [email or web address]: No    Best call back number:     [id number] - [place], [place] - [address] - P [phone] - F [phone] (Pharmacy) [phone]

## 2024-01-22 NOTE — TELEPHONE ENCOUNTER
Reason for call:  TC from [facility] Pharmacist. Pharmacist states a new script was received but does not have clear directions on how medication needs to be given.     Is this a new problem: Yes    Date of last appointment:  12/8/2023     Can we respond via [employer]: No    Best call back number: [facility] Pharmacist/Phone [phone]

## 2024-01-23 ENCOUNTER — TELEPHONE (OUTPATIENT)
Age: 39
End: 2024-01-23

## 2024-01-23 DIAGNOSIS — G57.03 BILATERAL PIRIFORMIS SYNDROME: ICD-10-CM

## 2024-01-23 DIAGNOSIS — G58.8 NEURALGIA OF LEFT PUDENDAL NERVE: ICD-10-CM

## 2024-01-23 NOTE — TELEPHONE ENCOUNTER
Kain from [facility] called requesting clarification for the directions, for the patient's prescription for Diazepam.  The directions state \"1 tablet vaginally, as directed, as needed.\"  The pharmacist is requiring an exact frequency.    [facility] - [phone]

## 2024-01-24 RX ORDER — DIAZEPAM 10 MG/1
TABLET ORAL
Qty: 30 TABLET | Refills: 0 | Status: SHIPPED | OUTPATIENT
Start: 2024-01-24 | End: 2025-01-24

## 2024-03-26 ENCOUNTER — PATIENT MESSAGE (OUTPATIENT)
Age: 39
End: 2024-03-26

## 2024-03-26 DIAGNOSIS — F98.8 ATTENTION DEFICIT DISORDER (ADD) WITHOUT HYPERACTIVITY: ICD-10-CM

## 2024-03-26 RX ORDER — ATOMOXETINE 25 MG/1
50 CAPSULE ORAL DAILY
Qty: 180 CAPSULE | Refills: 0 | Status: SHIPPED | OUTPATIENT
Start: 2024-03-26 | End: 2024-06-24

## 2024-03-26 NOTE — TELEPHONE ENCOUNTER
From: Jeannine Nguyen  To: Dr. Blair Vegas  Sent: 3/26/2024 6:09 AM EDT  Subject: Atomoxetine refill 50mg    Hi Timo,    I’ve increased the atomoxetine to 50 mg which seems to have helped even more with focus and attention as well as less anxiety. The 75mg caused headaches, nausea and HR in 100s after a few days of trying, so I reduced back to 50mg. Have my days but better overall.     I am on my last dose tomorrow. May I have a refill for the 50 mg to cover me over next 90 days please?     Thanks for your help on this! Holding all As in psychopharmacology with a 1/3 left in my material. Cant afford to not do well. Work is always stressful but it is what it is for now. Dez has been great and so supportive as always.     Have a nice spring break.    Jeannine

## 2024-03-26 NOTE — TELEPHONE ENCOUNTER
No chief complaint on file.    Last Appointment with Dr. Blair Vegas:  12/8/23    Future Appointments   Date Time Provider Department Center   6/17/2024  3:00 PM Blair Vegas MD [facility]   12/13/2024  8:40 AM Blair Vegas MD [facility]

## 2024-03-27 NOTE — TELEPHONE ENCOUNTER
Chart reviewed  Last visit: 12/8/2023    PDMP reviewed on 3/26/2024  9:00 PM.    Future Appointments   Date Time Provider Department Center   6/17/2024  3:00 PM Blair Vegas MD [facility]   12/13/2024  8:40 AM Blair Vegas MD [facility]

## 2024-04-11 DIAGNOSIS — Z01.84 IMMUNITY STATUS TESTING: Primary | ICD-10-CM

## 2024-06-17 ENCOUNTER — TELEMEDICINE (OUTPATIENT)
Age: 39
End: 2024-06-17
Payer: COMMERCIAL

## 2024-06-17 DIAGNOSIS — F98.8 ATTENTION DEFICIT DISORDER (ADD) WITHOUT HYPERACTIVITY: Primary | ICD-10-CM

## 2024-06-17 DIAGNOSIS — F41.9 ANXIETY: ICD-10-CM

## 2024-06-17 PROCEDURE — 99214 OFFICE O/P EST MOD 30 MIN: CPT | Performed by: INTERNAL MEDICINE

## 2024-06-17 RX ORDER — DULOXETIN HYDROCHLORIDE 30 MG/1
30 CAPSULE, DELAYED RELEASE ORAL DAILY
Qty: 15 CAPSULE | Refills: 0 | Status: SHIPPED | OUTPATIENT
Start: 2024-06-17 | End: 2024-07-02

## 2024-06-17 RX ORDER — ATOMOXETINE 25 MG/1
50 CAPSULE ORAL DAILY
Qty: 180 CAPSULE | Refills: 1 | Status: SHIPPED | OUTPATIENT
Start: 2024-06-17 | End: 2024-12-14

## 2024-06-17 RX ORDER — DULOXETIN HYDROCHLORIDE 60 MG/1
60 CAPSULE, DELAYED RELEASE ORAL DAILY
Qty: 90 CAPSULE | Refills: 0 | Status: SHIPPED | OUTPATIENT
Start: 2024-07-02

## 2024-06-17 ASSESSMENT — PATIENT HEALTH QUESTIONNAIRE - PHQ9
2. FEELING DOWN, DEPRESSED OR HOPELESS: NOT AT ALL
1. LITTLE INTEREST OR PLEASURE IN DOING THINGS: SEVERAL DAYS
SUM OF ALL RESPONSES TO PHQ QUESTIONS 1-9: 1
SUM OF ALL RESPONSES TO PHQ9 QUESTIONS 1 & 2: 1

## 2024-06-17 ASSESSMENT — ANXIETY QUESTIONNAIRES
4. TROUBLE RELAXING: NEARLY EVERY DAY
GAD7 TOTAL SCORE: 10
6. BECOMING EASILY ANNOYED OR IRRITABLE: NEARLY EVERY DAY
3. WORRYING TOO MUCH ABOUT DIFFERENT THINGS: NOT AT ALL
7. FEELING AFRAID AS IF SOMETHING AWFUL MIGHT HAPPEN: NOT AT ALL
IF YOU CHECKED OFF ANY PROBLEMS ON THIS QUESTIONNAIRE, HOW DIFFICULT HAVE THESE PROBLEMS MADE IT FOR YOU TO DO YOUR WORK, TAKE CARE OF THINGS AT HOME, OR GET ALONG WITH OTHER PEOPLE: SOMEWHAT DIFFICULT
1. FEELING NERVOUS, ANXIOUS, OR ON EDGE: NEARLY EVERY DAY
2. NOT BEING ABLE TO STOP OR CONTROL WORRYING: NOT AT ALL

## 2024-06-17 NOTE — PROGRESS NOTES
guardian's) consent. Patient identification was verified, and a caregiver was present when appropriate.   The patient was located at Other: work    Provider was located at Facility (Appt Dept):   [address] [address]    Confirm you are appropriately licensed, registered, or certified to deliver care in the state where the patient is located as indicated above. If you are not or unsure, please re-schedule the visit: Yes, I confirm.         Blair Vegas MD

## 2024-06-17 NOTE — ASSESSMENT & PLAN NOTE
Chronic issue that is worsening, I reviewed treatment options with her, I recommended to see a counselor, I reviewed life style changes to help improve mood, following changes were made today: will start on Cymbalta to help with anxiety and chronic pain.  We will ramp her up to 60mg quickly and reassess in 4-6 wks.   She was on medications in the past so not anticipating any side effects.  We did review buspar or SSRI if no benefit.

## 2024-06-17 NOTE — PATIENT INSTRUCTIONS
help.  Try yoga or debbie chi. These techniques combine exercise and meditation. You may need some training at first to learn them.  Do something you enjoy. For example, listen to music or go to a movie. Practice your hobby or do volunteer work.  Meditate. This can help you relax, because you are not worrying about what happened before or what may happen in the future.  Do guided imagery. Imagine yourself in any setting that helps you feel calm. You can use online videos, books, or a teacher to guide you.  Do breathing exercises. For example:  From a standing position, bend forward from the waist with your knees slightly bent. Let your arms dangle close to the floor.  Breathe in slowly and deeply as you return to a standing position. Roll up slowly and lift your head last.  Hold your breath for just a few seconds in the standing position.  Breathe out slowly and bend forward from the waist.  Let your feelings out. Talk, laugh, cry, and express anger when you need to. Talking with supportive friends or family, a counselor, or a jared leader about your feelings is a healthy way to relieve stress. Avoid discussing your feelings with people who make you feel worse.  Write. It may help to write about things that are bothering you. This helps you find out how much stress you feel and what is causing it. When you know this, you can find better ways to cope.  What can you do to prevent stress?  You might try some of these things to help prevent stress:  Manage your time. This helps you find time to do the things you want and need to do.  Get enough sleep. Your body recovers from the stresses of the day while you are sleeping.  Get support. Your family, friends, and community can make a difference in how you experience stress.  Limit your news feed. Avoid or limit time on social media or news that may make you feel stressed.  Do something active. Exercise or activity can help reduce stress. Walking is a great way to get

## 2024-06-17 NOTE — ASSESSMENT & PLAN NOTE
stable, continue current treatment, medications refilled, I don't think medications are driving anxiety.  [facility].

## 2024-06-27 DIAGNOSIS — G57.03 BILATERAL PIRIFORMIS SYNDROME: ICD-10-CM

## 2024-06-27 DIAGNOSIS — G58.8 NEURALGIA OF LEFT PUDENDAL NERVE: ICD-10-CM

## 2024-06-27 RX ORDER — DIAZEPAM 10 MG/1
TABLET ORAL
Qty: 30 TABLET | Refills: 0 | Status: SHIPPED | OUTPATIENT
Start: 2024-06-27 | End: 2025-06-28

## 2024-06-28 NOTE — TELEPHONE ENCOUNTER
Chart reviewed  Last filled on: 1/24/24 - #30.  Previously filled on 7/9/23 - #30.  Last visit: 6/17/2024    PDMP reviewed on 6/27/2024  8:33 PM.    Future Appointments   Date Time Provider Department Center   9/16/2024  1:20 PM Blair Vegas MD [facility]   12/13/2024  8:40 AM Blair Vegas MD [facility]

## 2024-09-16 ENCOUNTER — TELEMEDICINE (OUTPATIENT)
Age: 39
End: 2024-09-16
Payer: COMMERCIAL

## 2024-09-16 DIAGNOSIS — R23.2 HOT FLASHES: ICD-10-CM

## 2024-09-16 DIAGNOSIS — F41.9 ANXIETY: Primary | ICD-10-CM

## 2024-09-16 DIAGNOSIS — F98.8 ATTENTION DEFICIT DISORDER (ADD) WITHOUT HYPERACTIVITY: ICD-10-CM

## 2024-09-16 PROCEDURE — 99214 OFFICE O/P EST MOD 30 MIN: CPT | Performed by: INTERNAL MEDICINE

## 2024-09-16 RX ORDER — DULOXETIN HYDROCHLORIDE 30 MG/1
90 CAPSULE, DELAYED RELEASE ORAL DAILY
Qty: 270 CAPSULE | Refills: 0 | Status: SHIPPED | OUTPATIENT
Start: 2024-09-16

## 2024-09-16 ASSESSMENT — ANXIETY QUESTIONNAIRES
GAD7 TOTAL SCORE: 4
4. TROUBLE RELAXING: SEVERAL DAYS
5. BEING SO RESTLESS THAT IT IS HARD TO SIT STILL: SEVERAL DAYS
6. BECOMING EASILY ANNOYED OR IRRITABLE: SEVERAL DAYS
3. WORRYING TOO MUCH ABOUT DIFFERENT THINGS: NOT AT ALL
2. NOT BEING ABLE TO STOP OR CONTROL WORRYING: NOT AT ALL
IF YOU CHECKED OFF ANY PROBLEMS ON THIS QUESTIONNAIRE, HOW DIFFICULT HAVE THESE PROBLEMS MADE IT FOR YOU TO DO YOUR WORK, TAKE CARE OF THINGS AT HOME, OR GET ALONG WITH OTHER PEOPLE: SOMEWHAT DIFFICULT
7. FEELING AFRAID AS IF SOMETHING AWFUL MIGHT HAPPEN: NOT AT ALL
1. FEELING NERVOUS, ANXIOUS, OR ON EDGE: SEVERAL DAYS

## 2024-09-16 ASSESSMENT — PATIENT HEALTH QUESTIONNAIRE - PHQ9
SUM OF ALL RESPONSES TO PHQ9 QUESTIONS 1 & 2: 1
SUM OF ALL RESPONSES TO PHQ QUESTIONS 1-9: 1
SUM OF ALL RESPONSES TO PHQ QUESTIONS 1-9: 1
2. FEELING DOWN, DEPRESSED OR HOPELESS: NOT AT ALL
SUM OF ALL RESPONSES TO PHQ QUESTIONS 1-9: 1
1. LITTLE INTEREST OR PLEASURE IN DOING THINGS: SEVERAL DAYS
SUM OF ALL RESPONSES TO PHQ QUESTIONS 1-9: 1

## 2024-09-16 ASSESSMENT — ENCOUNTER SYMPTOMS
COUGH: 0
DIARRHEA: 0
VOMITING: 0
CONSTIPATION: 0
ABDOMINAL PAIN: 0
SHORTNESS OF BREATH: 0
NAUSEA: 0

## 2024-10-02 DIAGNOSIS — F98.8 ATTENTION DEFICIT DISORDER (ADD) WITHOUT HYPERACTIVITY: Primary | ICD-10-CM

## 2024-10-02 NOTE — TELEPHONE ENCOUNTER
Chart reviewed, see [employer] message, requesting medication change.  Last filled on: 5/5/23 - Vyvanse 30mg  Last visit: 9/16/2024    PDMP reviewed on 6/27/2024  8:33 PM.    Future Appointments   Date Time Provider Department Center   12/13/2024  8:40 AM Blair Vegas MD [facility] DE[facility]

## 2024-10-18 DIAGNOSIS — G58.8 NEURALGIA OF LEFT PUDENDAL NERVE: ICD-10-CM

## 2024-10-18 DIAGNOSIS — G57.03 BILATERAL PIRIFORMIS SYNDROME: ICD-10-CM

## 2024-10-18 NOTE — TELEPHONE ENCOUNTER
Chief Complaint   Patient presents with    Medication Refill     Last Appointment with Dr. Blair Vegas:  9/16/2024   Future Appointments   Date Time Provider Department Center   12/13/2024  8:40 AM Blair Vegas MD [facility]

## 2024-10-20 RX ORDER — DIAZEPAM 10 MG
TABLET ORAL
Qty: 30 TABLET | Refills: 0 | Status: SHIPPED | OUTPATIENT
Start: 2024-10-20 | End: 2025-10-19

## 2024-11-26 DIAGNOSIS — F98.8 ATTENTION DEFICIT DISORDER (ADD) WITHOUT HYPERACTIVITY: ICD-10-CM

## 2024-11-26 NOTE — TELEPHONE ENCOUNTER
Chief Complaint   Patient presents with    Medication Refill     Last Appointment with Dr. Blair Vegas:  9/16/2024   Future Appointments   Date Time Provider Department Center   12/13/2024  8:40 AM Blair Vegas MD [facility]

## 2024-12-13 ENCOUNTER — OFFICE VISIT (OUTPATIENT)
Age: 39
End: 2024-12-13
Payer: COMMERCIAL

## 2024-12-13 VITALS
BODY MASS INDEX: 21.82 KG/M2 | TEMPERATURE: 97.7 F | SYSTOLIC BLOOD PRESSURE: 127 MMHG | OXYGEN SATURATION: 100 % | HEIGHT: 67 IN | DIASTOLIC BLOOD PRESSURE: 79 MMHG | RESPIRATION RATE: 16 BRPM | WEIGHT: 139 LBS | HEART RATE: 80 BPM

## 2024-12-13 DIAGNOSIS — F90.0 ATTENTION DEFICIT HYPERACTIVITY DISORDER (ADHD), PREDOMINANTLY INATTENTIVE TYPE: ICD-10-CM

## 2024-12-13 DIAGNOSIS — R51.9 CHRONIC DAILY HEADACHE: ICD-10-CM

## 2024-12-13 DIAGNOSIS — Z00.00 ROUTINE PHYSICAL EXAMINATION: Primary | ICD-10-CM

## 2024-12-13 DIAGNOSIS — F41.9 ANXIETY: ICD-10-CM

## 2024-12-13 DIAGNOSIS — G57.03 BILATERAL PIRIFORMIS SYNDROME: ICD-10-CM

## 2024-12-13 PROCEDURE — 99396 PREV VISIT EST AGE 40-64: CPT | Performed by: INTERNAL MEDICINE

## 2024-12-13 RX ORDER — DULOXETIN HYDROCHLORIDE 30 MG/1
90 CAPSULE, DELAYED RELEASE ORAL DAILY
Qty: 270 CAPSULE | Refills: 1 | Status: SHIPPED | OUTPATIENT
Start: 2024-12-13

## 2024-12-13 SDOH — ECONOMIC STABILITY: FOOD INSECURITY: WITHIN THE PAST 12 MONTHS, YOU WORRIED THAT YOUR FOOD WOULD RUN OUT BEFORE YOU GOT MONEY TO BUY MORE.: NEVER TRUE

## 2024-12-13 SDOH — ECONOMIC STABILITY: FOOD INSECURITY: WITHIN THE PAST 12 MONTHS, THE FOOD YOU BOUGHT JUST DIDN'T LAST AND YOU DIDN'T HAVE MONEY TO GET MORE.: NEVER TRUE

## 2024-12-13 SDOH — ECONOMIC STABILITY: INCOME INSECURITY: HOW HARD IS IT FOR YOU TO PAY FOR THE VERY BASICS LIKE FOOD, HOUSING, MEDICAL CARE, AND HEATING?: NOT HARD AT ALL

## 2024-12-13 ASSESSMENT — ENCOUNTER SYMPTOMS
VOMITING: 0
CONSTIPATION: 0
NAUSEA: 0
ABDOMINAL PAIN: 0
BLOOD IN STOOL: 0
COUGH: 0
DIARRHEA: 0
SHORTNESS OF BREATH: 0
BACK PAIN: 1

## 2024-12-13 ASSESSMENT — PATIENT HEALTH QUESTIONNAIRE - PHQ9
2. FEELING DOWN, DEPRESSED OR HOPELESS: NOT AT ALL
SUM OF ALL RESPONSES TO PHQ QUESTIONS 1-9: 0
SUM OF ALL RESPONSES TO PHQ QUESTIONS 1-9: 0
SUM OF ALL RESPONSES TO PHQ9 QUESTIONS 1 & 2: 0
SUM OF ALL RESPONSES TO PHQ QUESTIONS 1-9: 0
1. LITTLE INTEREST OR PLEASURE IN DOING THINGS: NOT AT ALL
SUM OF ALL RESPONSES TO PHQ QUESTIONS 1-9: 0

## 2024-12-13 ASSESSMENT — LIFESTYLE VARIABLES
HOW OFTEN DO YOU HAVE A DRINK CONTAINING ALCOHOL: 2-3 TIMES A WEEK
HOW MANY STANDARD DRINKS CONTAINING ALCOHOL DO YOU HAVE ON A TYPICAL DAY: 1 OR 2

## 2024-12-13 NOTE — ASSESSMENT & PLAN NOTE
chronic issue, new to me, differential reviewed. Favor medication vs stress. Doesn't want to stop ADHD meds at this time. Reveiwed limiting NSAIDs to avoid rebound. Discussed PT, heat, and massage. No red flags.

## 2024-12-13 NOTE — ASSESSMENT & PLAN NOTE
Improved on medication, having some side effects (muscle tension), but she wants to continue. She is planning on stopping after school is completed.  [facility]

## 2024-12-13 NOTE — PROGRESS NOTES
Cervical: No cervical adenopathy.   Psychiatric:         Mood and Affect: Mood normal.          Vitals:    12/13/24 0824   BP: 127/79   Pulse: 80   Resp: 16   Temp: 97.7 °F (36.5 °C)   TempSrc: Temporal   SpO2: 100%   Weight: 63 kg (139 lb)   Height: 1.701 m (5' 6.97\")      Body mass index is 21.79 kg/m².     Blair Vegas MD   
Warm

## 2024-12-13 NOTE — ASSESSMENT & PLAN NOTE
Chronic issue that is improved since dose increase of Cymbalta. I reviewed treatment options with her, I recommended to see a counselor, I reviewed life style changes to help improve mood, continue current treatment.

## 2024-12-13 NOTE — ASSESSMENT & PLAN NOTE
Stable and chronic issue which she has had extensive working in the past at [facility] and [facility].  Will continue with as needed use of Valium.

## 2024-12-14 LAB
ALBUMIN SERPL-MCNC: 4.9 G/DL (ref 3.9–4.9)
ALP SERPL-CCNC: 59 IU/L (ref 44–121)
ALT SERPL-CCNC: 17 IU/L (ref 0–32)
AST SERPL-CCNC: 20 IU/L (ref 0–40)
BILIRUB SERPL-MCNC: 0.8 MG/DL (ref 0–1.2)
BUN SERPL-MCNC: 15 MG/DL (ref 6–24)
BUN/CREAT SERPL: 18 (ref 9–23)
CALCIUM SERPL-MCNC: 9.5 MG/DL (ref 8.7–10.2)
CHLORIDE SERPL-SCNC: 103 MMOL/L (ref 96–106)
CHOLEST SERPL-MCNC: 195 MG/DL (ref 100–199)
CO2 SERPL-SCNC: 23 MMOL/L (ref 20–29)
CREAT SERPL-MCNC: 0.83 MG/DL (ref 0.57–1)
EGFRCR SERPLBLD CKD-EPI 2021: 91 ML/MIN/1.73
ERYTHROCYTE [DISTWIDTH] IN BLOOD BY AUTOMATED COUNT: 12.4 % (ref 11.7–15.4)
FSH SERPL-ACNC: 11.4 MIU/ML
GLOBULIN SER CALC-MCNC: 2 G/DL (ref 1.5–4.5)
GLUCOSE SERPL-MCNC: 84 MG/DL (ref 70–99)
HCT VFR BLD AUTO: 41 % (ref 34–46.6)
HCV AB SERPL QL IA: NORMAL
HCV IGG SERPL QL IA: NON REACTIVE
HDLC SERPL-MCNC: 80 MG/DL
HGB BLD-MCNC: 13.5 G/DL (ref 11.1–15.9)
LDLC SERPL CALC-MCNC: 104 MG/DL (ref 0–99)
LH SERPL-ACNC: 7.9 MIU/ML
MCH RBC QN AUTO: 29.7 PG (ref 26.6–33)
MCHC RBC AUTO-ENTMCNC: 32.9 G/DL (ref 31.5–35.7)
MCV RBC AUTO: 90 FL (ref 79–97)
PLATELET # BLD AUTO: 345 X10E3/UL (ref 150–450)
POTASSIUM SERPL-SCNC: 4.7 MMOL/L (ref 3.5–5.2)
PROT SERPL-MCNC: 6.9 G/DL (ref 6–8.5)
RBC # BLD AUTO: 4.54 X10E6/UL (ref 3.77–5.28)
SODIUM SERPL-SCNC: 141 MMOL/L (ref 134–144)
TRIGL SERPL-MCNC: 60 MG/DL (ref 0–149)
TSH SERPL DL<=0.005 MIU/L-ACNC: 2.37 UIU/ML (ref 0.45–4.5)
VLDLC SERPL CALC-MCNC: 11 MG/DL (ref 5–40)
WBC # BLD AUTO: 7 X10E3/UL (ref 3.4–10.8)

## 2025-02-06 DIAGNOSIS — G57.03 BILATERAL PIRIFORMIS SYNDROME: ICD-10-CM

## 2025-02-06 DIAGNOSIS — F90.0 ATTENTION DEFICIT HYPERACTIVITY DISORDER (ADHD), PREDOMINANTLY INATTENTIVE TYPE: ICD-10-CM

## 2025-02-06 DIAGNOSIS — G58.8 NEURALGIA OF LEFT PUDENDAL NERVE: ICD-10-CM

## 2025-02-06 NOTE — TELEPHONE ENCOUNTER
Pt last seen on 12/13/2024. Due to return in 6 months.    Has appt on 6/13/2025.    Rx last filled on:  10/20/24 Valium #30/0RF  1/23/25 Methylphenidate #30/0RF - too soon. There is a script for 2/22/25 in chart.    Will forward to MD for refill.

## 2025-02-07 RX ORDER — DIAZEPAM 10 MG/1
TABLET ORAL
Qty: 30 TABLET | Refills: 0 | Status: SHIPPED | OUTPATIENT
Start: 2025-02-07 | End: 2026-02-05

## 2025-02-07 NOTE — TELEPHONE ENCOUNTER
Chart reviewed.  Valium last filled on 10/22/24. Normally 30 tabs lasting 5-6 months.  Jornay last filled on 11/26, though Rx sent in on 12/13/24 to be filled on 1/23 and 2/22. Should have refill at pharmacy.  Last visit: 12/13/2024    PDMP reviewed on 2/7/2025  7:38 AM.    Future Appointments   Date Time Provider Department Center   6/13/2025 12:20 PM Blair Vegas MD [facility] DEP   12/16/2025  8:40 AM Blair Vegas MD [facility] DEP

## 2025-06-11 ENCOUNTER — TELEPHONE (OUTPATIENT)
Age: 40
End: 2025-06-11

## 2025-06-13 ENCOUNTER — CLINICAL DOCUMENTATION (OUTPATIENT)
Age: 40
End: 2025-06-13

## 2025-06-13 ENCOUNTER — OFFICE VISIT (OUTPATIENT)
Age: 40
End: 2025-06-13
Payer: COMMERCIAL

## 2025-06-13 VITALS
TEMPERATURE: 98.6 F | SYSTOLIC BLOOD PRESSURE: 134 MMHG | OXYGEN SATURATION: 100 % | RESPIRATION RATE: 16 BRPM | BODY MASS INDEX: 23.67 KG/M2 | HEIGHT: 67 IN | HEART RATE: 75 BPM | DIASTOLIC BLOOD PRESSURE: 86 MMHG | WEIGHT: 150.8 LBS

## 2025-06-13 DIAGNOSIS — F90.0 ATTENTION DEFICIT HYPERACTIVITY DISORDER (ADHD), PREDOMINANTLY INATTENTIVE TYPE: ICD-10-CM

## 2025-06-13 DIAGNOSIS — Z01.818 PRE-OP EXAMINATION: Primary | ICD-10-CM

## 2025-06-13 DIAGNOSIS — F41.9 ANXIETY: ICD-10-CM

## 2025-06-13 DIAGNOSIS — G89.29 CHRONIC UPPER BACK PAIN: ICD-10-CM

## 2025-06-13 DIAGNOSIS — R63.5 WEIGHT GAIN: ICD-10-CM

## 2025-06-13 DIAGNOSIS — G57.03 BILATERAL PIRIFORMIS SYNDROME: ICD-10-CM

## 2025-06-13 DIAGNOSIS — M54.9 CHRONIC UPPER BACK PAIN: ICD-10-CM

## 2025-06-13 PROCEDURE — 99214 OFFICE O/P EST MOD 30 MIN: CPT | Performed by: INTERNAL MEDICINE

## 2025-06-13 RX ORDER — ESTRADIOL 0.05 MG/D
1 PATCH TRANSDERMAL WEEKLY
COMMUNITY

## 2025-06-13 RX ORDER — DEXTROAMPHETAMINE SACCHARATE, AMPHETAMINE ASPARTATE MONOHYDRATE, DEXTROAMPHETAMINE SULFATE AND AMPHETAMINE SULFATE 2.5; 2.5; 2.5; 2.5 MG/1; MG/1; MG/1; MG/1
10 CAPSULE, EXTENDED RELEASE ORAL EVERY MORNING
COMMUNITY
Start: 2025-06-11

## 2025-06-13 SDOH — ECONOMIC STABILITY: FOOD INSECURITY: WITHIN THE PAST 12 MONTHS, THE FOOD YOU BOUGHT JUST DIDN'T LAST AND YOU DIDN'T HAVE MONEY TO GET MORE.: NEVER TRUE

## 2025-06-13 SDOH — ECONOMIC STABILITY: FOOD INSECURITY: WITHIN THE PAST 12 MONTHS, YOU WORRIED THAT YOUR FOOD WOULD RUN OUT BEFORE YOU GOT MONEY TO BUY MORE.: NEVER TRUE

## 2025-06-13 ASSESSMENT — ENCOUNTER SYMPTOMS
CONSTIPATION: 0
ABDOMINAL PAIN: 0
COUGH: 0
NAUSEA: 0
VOMITING: 0
SHORTNESS OF BREATH: 0
DIARRHEA: 0
BLOOD IN STOOL: 0

## 2025-06-13 ASSESSMENT — PATIENT HEALTH QUESTIONNAIRE - PHQ9
SUM OF ALL RESPONSES TO PHQ QUESTIONS 1-9: 0
SUM OF ALL RESPONSES TO PHQ QUESTIONS 1-9: 0
1. LITTLE INTEREST OR PLEASURE IN DOING THINGS: NOT AT ALL
SUM OF ALL RESPONSES TO PHQ QUESTIONS 1-9: 0
2. FEELING DOWN, DEPRESSED OR HOPELESS: NOT AT ALL
SUM OF ALL RESPONSES TO PHQ QUESTIONS 1-9: 0

## 2025-06-13 NOTE — PROGRESS NOTES
Jeannine Nguyen is 40 y.o. female (1985) who presents for preoperative evaluation.     Assessment & Plan:   1. Pre-op examination  Comments:  no contra-indications to planned procedure, form completed.  2. Chronic upper back pain  Comments:  will reassess after surgery  3. Bilateral piriformis syndrome  Assessment & Plan:  Stable and chronic issue which she has had extensive working in the past at [facility] and [facility].  Will continue with as needed use of Valium. [facility].  4. Attention deficit hyperactivity disorder (ADHD), predominantly inattentive type  Assessment & Plan:  Since last visit has established with a psychiatrist and will defer medications to them. Changed from Jornay to Adderall. [facility]  reviewed.   5. Anxiety  Assessment & Plan:  Since last visit has established with a psychiatrist so going forward will defer to specialist. Recently changed from Cymbalta to Trintellix.    6. Weight gain  Comments:  new diagnosis, still in the normal weight, up 11 lbs, likely due to stress eating & decreased exercise. She doesn't feel it is med related. Life style changes.     Return in about 6 months (around 12/13/2025) for FULL PHYSICAL.     According to the 2014 ACC/AHA pre-operative risk assessment guidelines Jeannine Nguyen is at low risk for major cardiac complications during a low risk procedure (endoscopy, superficial skin, breast, ambulatory, or cataract, etc.) procedure and procedure may proceed as planned.    Medication Recommendations: NONE    Subjective:     Procedure/Surgery: Breast implant removal   Date of Procedure/Surgery: 6/27/2025   Surgeon: Dr Kolton Caicedo  [facility] Facility: [facility] [place]  Primary Physician: Blair Vegas MD     Reason for surgery: back pain    Latex Allergy: No  Recent use of: No recent use of aspirin (ASA), NSAIDS or steroids  Tetanus vaccine: last tetanus booster within 10 years  Anesthesia Complications: None  History of abnormal

## 2025-06-13 NOTE — ASSESSMENT & PLAN NOTE
Since last visit has established with a psychiatrist and will defer medications to them. Changed from Jornay to Adderall. [facility]  reviewed.

## 2025-06-13 NOTE — ASSESSMENT & PLAN NOTE
Stable and chronic issue which she has had extensive working in the past at [facility] and [facility].  Will continue with as needed use of Valium. [facility]  reviewed.

## 2025-06-13 NOTE — ASSESSMENT & PLAN NOTE
Since last visit has established with a psychiatrist so going forward will defer to specialist. Recently changed from Cymbalta to Trintellix.

## 2025-06-29 DIAGNOSIS — G57.03 BILATERAL PIRIFORMIS SYNDROME: ICD-10-CM

## 2025-06-29 DIAGNOSIS — G58.8 NEURALGIA OF LEFT PUDENDAL NERVE: ICD-10-CM

## 2025-07-01 RX ORDER — DIAZEPAM 10 MG/1
TABLET ORAL
Qty: 30 TABLET | Refills: 1 | Status: SHIPPED | OUTPATIENT
Start: 2025-07-01 | End: 2026-06-27

## 2025-07-01 NOTE — TELEPHONE ENCOUNTER
Chart reviewed  Last filled on: 2/7/25 - 10mg - #30.  Last visit: 6/13/2025    PDMP reviewed on 7/1/2025  8:22 AM.    Future Appointments   Date Time Provider Department Center   12/16/2025  8:40 AM Blair Vegas MD [facility] DEP